# Patient Record
Sex: FEMALE | URBAN - METROPOLITAN AREA
[De-identification: names, ages, dates, MRNs, and addresses within clinical notes are randomized per-mention and may not be internally consistent; named-entity substitution may affect disease eponyms.]

---

## 2023-08-22 ENCOUNTER — APPOINTMENT (RX ONLY)
Dept: URBAN - METROPOLITAN AREA CLINIC 70 | Facility: CLINIC | Age: 43
Setting detail: DERMATOLOGY
End: 2023-08-22

## 2023-08-22 VITALS — HEIGHT: 63 IN | WEIGHT: 168 LBS

## 2023-08-22 DIAGNOSIS — L40.0 PSORIASIS VULGARIS: ICD-10-CM | Status: INADEQUATELY CONTROLLED

## 2023-08-22 PROCEDURE — ? PRESCRIPTION MEDICATION MANAGEMENT

## 2023-08-22 PROCEDURE — ? FULL BODY SKIN EXAM - DECLINED

## 2023-08-22 PROCEDURE — ? COUNSELING

## 2023-08-22 PROCEDURE — ? ORDER TESTS

## 2023-08-22 PROCEDURE — ? PRESCRIPTION

## 2023-08-22 PROCEDURE — ? TREMFYA INITIATION

## 2023-08-22 PROCEDURE — 99204 OFFICE O/P NEW MOD 45 MIN: CPT

## 2023-08-22 RX ORDER — CLOBETASOL PROPIONATE 0.5 MG/ML
SOLUTION TOPICAL
Qty: 50 | Refills: 3 | Status: ERX | COMMUNITY
Start: 2023-08-22

## 2023-08-22 RX ORDER — GUSELKUMAB 100 MG/ML
INJECTION SUBCUTANEOUS
Qty: 1 | Refills: 6 | Status: ERX | COMMUNITY
Start: 2023-08-22

## 2023-08-22 RX ADMIN — CLOBETASOL PROPIONATE: 0.5 SOLUTION TOPICAL at 00:00

## 2023-08-22 RX ADMIN — GUSELKUMAB: 100 INJECTION SUBCUTANEOUS at 00:00

## 2023-08-22 ASSESSMENT — LOCATION DETAILED DESCRIPTION DERM
LOCATION DETAILED: RIGHT CENTRAL POSTAURICULAR SKIN
LOCATION DETAILED: RIGHT CAVUM CONCHA
LOCATION DETAILED: LEFT SUPERIOR PREAURICULAR CHEEK
LOCATION DETAILED: MID-OCCIPITAL SCALP
LOCATION DETAILED: LEFT CAVUM CONCHA
LOCATION DETAILED: LEFT CENTRAL POSTAURICULAR SKIN
LOCATION DETAILED: RIGHT SUPERIOR PREAURICULAR CHEEK

## 2023-08-22 ASSESSMENT — BSA PSORIASIS: % BODY COVERED IN PSORIASIS: 15

## 2023-08-22 ASSESSMENT — LOCATION SIMPLE DESCRIPTION DERM
LOCATION SIMPLE: LEFT EAR
LOCATION SIMPLE: RIGHT CHEEK
LOCATION SIMPLE: RIGHT EAR
LOCATION SIMPLE: SCALP
LOCATION SIMPLE: POSTERIOR SCALP
LOCATION SIMPLE: LEFT CHEEK

## 2023-08-22 ASSESSMENT — LOCATION ZONE DERM
LOCATION ZONE: SCALP
LOCATION ZONE: FACE
LOCATION ZONE: EAR

## 2023-08-22 ASSESSMENT — PGA PSORIASIS: PGA PSORIASIS 2020: MODERATE

## 2023-08-22 NOTE — PROCEDURE: PRESCRIPTION MEDICATION MANAGEMENT
Initiate Treatment: Clobetasol scalp solution, Tremfya-pending labs/insurance approval
Render In Strict Bullet Format?: No
Detail Level: Zone

## 2023-08-22 NOTE — PROCEDURE: MIPS QUALITY
Quality 130: Documentation Of Current Medications In The Medical Record: Current Medications Documented
Quality 431: Preventive Care And Screening: Unhealthy Alcohol Use - Screening: Patient not identified as an unhealthy alcohol user when screened for unhealthy alcohol use using a systematic screening method
Detail Level: Detailed
Quality 137: Melanoma: Continuity Of Care - Recall System: Recall system not utilized, reason not otherwise specified
Quality 226: Preventive Care And Screening: Tobacco Use: Screening And Cessation Intervention: Patient screened for tobacco use, is a smoker AND received Cessation Counseling within measurement period or in the six months prior to the measurement period

## 2023-08-22 NOTE — PROCEDURE: ORDER TESTS
Billing Type: Third-Party Bill
Performing Laboratory: -[id number]
Bill For Surgical Tray: no
Expected Date Of Service: 08/22/2023

## 2023-08-22 NOTE — PROCEDURE: TREMFYA INITIATION
Is Phototherapy Contraindicated?: Yes
Add Pregnancy And Lactation Warning To Medication Counseling?: No
Tremfya Dosing: 100 mg SC week 0 and week 4 then every 8 weeks thereafter
Diagnosis (Required): Psoriasis
Pregnancy And Lactation Warning Text: The risk during pregnancy and breastfeeding is uncertain with this medication.
Tremfya Monitoring Guidelines: A yearly test for tuberculosis is required while taking Tremfya.
Phototherapy Specific Contraindications (Override- The Patient.....): scalp and ear involvement
Detail Level: Zone

## 2023-08-22 NOTE — HPI: RASH
Is This A New Presentation, Or A Follow-Up?: Rash
Additional History: Patient reports she was evaluated by another dermatologist, diagnosed as psoriasis. Patient denies joint involvement. Patient is interested in biologic therapy.

## 2023-08-23 RX ORDER — GUSELKUMAB 100 MG/ML
INJECTION SUBCUTANEOUS
Qty: 1 | Refills: 6 | Status: ERX